# Patient Record
Sex: MALE | Race: WHITE
[De-identification: names, ages, dates, MRNs, and addresses within clinical notes are randomized per-mention and may not be internally consistent; named-entity substitution may affect disease eponyms.]

---

## 2019-10-16 ENCOUNTER — HOSPITAL ENCOUNTER (OUTPATIENT)
Dept: HOSPITAL 47 - LABWHC1 | Age: 79
End: 2019-10-16
Attending: UROLOGY
Payer: MEDICARE

## 2019-10-16 DIAGNOSIS — R97.20: Primary | ICD-10-CM

## 2019-10-16 PROCEDURE — 36415 COLL VENOUS BLD VENIPUNCTURE: CPT

## 2019-10-16 PROCEDURE — 84153 ASSAY OF PSA TOTAL: CPT

## 2023-06-18 ENCOUNTER — HOSPITAL ENCOUNTER (EMERGENCY)
Dept: HOSPITAL 47 - EC | Age: 83
LOS: 1 days | Discharge: HOME | End: 2023-06-19
Payer: MEDICARE

## 2023-06-18 VITALS — RESPIRATION RATE: 16 BRPM | TEMPERATURE: 97.1 F

## 2023-06-18 DIAGNOSIS — S01.81XA: Primary | ICD-10-CM
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
## 2023-06-18 PROCEDURE — 72170 X-RAY EXAM OF PELVIS: CPT

## 2023-06-18 PROCEDURE — 71045 X-RAY EXAM CHEST 1 VIEW: CPT

## 2023-06-18 PROCEDURE — 70450 CT HEAD/BRAIN W/O DYE: CPT

## 2023-06-18 PROCEDURE — 99284 EMERGENCY DEPT VISIT MOD MDM: CPT

## 2023-06-18 PROCEDURE — 72125 CT NECK SPINE W/O DYE: CPT

## 2023-06-18 PROCEDURE — 93005 ELECTROCARDIOGRAM TRACING: CPT

## 2023-06-18 PROCEDURE — 12011 RPR F/E/E/N/L/M 2.5 CM/<: CPT

## 2023-06-18 NOTE — ED
General Adult HPI





- General


Chief complaint: Fall


Stated complaint: FALL WITH HEAD INJURY


Time Seen by Provider: 06/18/23 22:30


Source: patient


Limitations: no limitations





- History of Present Illness


Initial comments: 


Dictation was produced using dragon dictation software. please excuse any 

grammatical, word or spelling errors. 











Chief Complaint: 83-year-old male with past medical history of neuropathy 

presents emergency department after fall and head laceration





History of Present Illness:.  Patient is an 83-year-old male brought in by EMS. 

Patient's history of neuropathy.  He states that he has chronic unsteady gait.  

States that he lost his balance causing fall.  He did hit his head.  Denies any 

loss of consciousness.  Patient denies history of anticoagulation use.  Denies 

any headache.








The ROS documented in this emergency department record has been reviewed and 

confirmed by me.  Those systems with pertinent positive or negative responses 

have been documented in the HPI.  All other systems are other negative and/or 

noncontributory.

















- Related Data


                                    Allergies











Allergy/AdvReac Type Severity Reaction Status Date / Time


 


No Known Allergies Allergy   Verified 06/18/23 22:35














Review of Systems


ROS Statement: 


Those systems with pertinent positive or pertinent negative responses have been 

documented in the HPI.





ROS Other: All systems not noted in ROS Statement are negative.





Past Medical History


Past Medical History: Prostate Disorder


Additional Past Medical History / Comment(s): neuropathy


History of Any Multi-Drug Resistant Organisms: None Reported


Past Surgical History: No Surgical Hx Reported


Past Psychological History: No Psychological Hx Reported


Smoking Status: Never smoker


Past Alcohol Use History: Occasional


Past Drug Use History: None Reported





General Exam





- General Exam Comments


Initial Comments: 








PHYSICAL EXAM:


General Impression: Alert and oriented x3, not in acute distress


HEENT: 2 cm laceration to the right eyebrow, extra-ocular movements intact, 

pupils equal and reactive to light bilaterally, mucous membranes moist.


Cardiovascular: Heart regular rate and rhythm


Chest: Able to complete full sentences, no retractions, no tachypnea


Abdomen: abdomen soft, non-tender, non-distended, no organomegaly


Musculoskeletal: Pulses present and equal in all extremities, no peripheral 

edema


Motor:  no focal deficits noted


Neurological: CN II-XII grossly intact, no focal motor or sensory deficits noted


Skin: Intact with no visualized rashes


Psych: Normal affect and mood











Limitations: no limitations





Course


                                   Vital Signs











  06/18/23 06/18/23 06/18/23





  22:33 22:36 23:00


 


Temperature  97.1 F L 


 


Pulse Rate 78 77 80


 


Respiratory 18 16 16





Rate   


 


Blood Pressure  123/61 123/61


 


O2 Sat by Pulse 96 98 97





Oximetry   














  06/19/23 06/19/23





  00:00 01:00


 


Temperature  


 


Pulse Rate 81 85


 


Respiratory 16 16





Rate  


 


Blood Pressure 121/61 


 


O2 Sat by Pulse 95 94 L





Oximetry  














Procedures





- Laceration


  ** Laceration #1


Consent Obtained: verbal consent


Indication: laceration


Site: face


Description: linear


Depth: simple, single layer


Anesthetic Used: lidocaine 2%, with epi


Anesthesia Technique: local infiltration


Pre-repair: wound explored, deep structures intact


Type of Sutures: nylon


Size of Sutures: 5-0 (3 cm)


Technique: simple, interrupted


Patient Tolerated Procedure: well





Medical Decision Making





- Medical Decision Making


Was pt. sent in by a medical professional or institution (OVID[facility] Thakkar, NP, urgent 

care, hospital, or nursing home...) When possible be specific


@  -No


Did you speak to anyone other than the patient for history (EMS, parent, family,

police, friend...)? What history was obtained from this source 


@  -No


Did you review nursing and triage notes (agree or disagree)?  Why? 


@  -I reviewed and agree with nursing and triage notes


Were old charts reviewed (outside hosp., previous admission, EMS record, old 

EKG, old radiological studies, urgent care reports/EKG's, nursing home records)?

Report findings 


@  -No old charts were reviewed


Differential Diagnosis (chest pain, altered mental status, abdominal pain women,

abdominal pain men, vaginal bleeding, musculoskeletal, weakness, fever, dyspnea,

syncope, headache, dizziness, GI bleed, back pain, seizure, CVA, palpatations, 

mental health)? 


@  -Head injury, skull fracture, cranial bleed, facial fracture


EKG interpreted by me (3pts min.).


@  -[facility] EKG interpretation: Ventricular rate 76, sinus rhythm,.  179, QRS 76, 

. No OK prolongation, no QTC prolongation, no ST or T-wave changes noted.

   Overall, this EKG is unremarkable





X-rays interpreted by me (1pt min.).


@  -Chest x-ray and pelvis x-ray unremarkable


CT interpreted by me (1pt min.).


@  -Computed tomography scan of the head and C-spine shows no acute processes


U/S interpreted by me (1pt. min.).


@  -None done


What testing was considered but not performed or refused? (CT, X-rays, U/S, 

labs)? Why?


@  -None


What meds were considered but not given or refused? Why?


@  -None


Did you discuss the management of the patient with other professionals 

(professionals i.e. Dr., PA, NP, lab, RT, psych nurse, , , 

teacher, , )? Give summary


@  -No


Was smoking cessation discussed for >3mins.?


@  -No


Was critical care preformed (if so, how long)?


@  -No


Were there social determinants of health that impacted care today? How? 

(Homelessness, low income, unemployed, alcoholism, drug addiction, 

transportation, low edu. Level, literacy, decrease access to med. care, group home, 

rehab)?


@  -No


Was there de-escalation of care discussed even if they declined (Discuss DNR or 

withdrawal of care, Hospice)? DNR status


@  -No


What co-morbidities impacted this encounter? (DM, HTN, Smoking, COPD, CAD, 

Cancer, CVA, ARF, Chemo, Hep., AIDS, mental health diagnosis, sleep apnea, 

morbid obesity)?


@  -None


Was patient admitted / discharged? Hospital course, mention meds given and 

route, prescriptions, significant lab abnormalities, going to OR and other 

pertinent info.


@  -See above 


Undiagnosed new problem with uncertain prognosis?


@  -No


Drug Therapy requiring intensive monitoring for toxicity (Heparin, Nitro, 

Insulin, Cardizem)?


@  -No


Were any procedures done?


@  -Laceration repair, see above for further detail


Diagnosis/symptom?  Acute, or Chronic, or Acute on Chronic?  Uncomplicated 

(without systemic symptoms) or Complicated (systemic symptoms)?


@  -83-year-old male presents to the emergency department after mechanical fall.

 Laceration.  Bedside.  Vital signs stable.  Patient denies any cardiac history.

 CT imaging and x-rays are unremarkable.  Patient discharged


Side effects of treatment?


@  -No


Exacerbation, Progression, or Severe Exacerbation?


@  -No


Poses a threat to life or bodily function? How? (Chest pain, USA, MI, pneumonia,

PE, COPD, DKA, ARF, appy, cholecystitis, CVA, Diverticulitis, Homicidal, 

Suicidal, threat to staff... and all critical care pts)


@  -No








Disposition


Clinical Impression: 


 Fall, Facial laceration





Disposition: HOME SELF-CARE


Condition: Good


Instructions (If sedation given, give patient instructions):  Fall Prevention 

for Older Adults (ED)


Additional Instructions: 


stitches removal in 5 days


Is patient prescribed a controlled substance at d/c from ED?: No


Referrals: 


Maksim Wilder MD [Primary Care Provider] - 1-2 days

## 2023-06-19 VITALS — SYSTOLIC BLOOD PRESSURE: 114 MMHG | DIASTOLIC BLOOD PRESSURE: 72 MMHG | HEART RATE: 80 BPM

## 2023-06-19 NOTE — XR
EXAM:

  XR Pelvis, 1 or 2 Views

 

CLINICAL HISTORY:

  ITS.REASON XR Reason: fall

 

TECHNIQUE:

  Frontal view of the pelvis.

 

COMPARISON:

  No relevant prior studies available.

 

FINDINGS:

  Bones/joints:  No acute osseous abnormalities.

  Soft tissues:  Unremarkable.

 

IMPRESSION:     

No acute bony pelvic injury.

## 2023-06-19 NOTE — XR
EXAM:

  XR Chest, 1 View

 

CLINICAL HISTORY:

  ITS.REASON XR Reason: fall

 

TECHNIQUE:

  Frontal view of the chest.

 

COMPARISON:

  No relevant prior studies available.

 

FINDINGS:

  Lungs:  Unremarkable.  No consolidation.

  Pleural space:  Unremarkable.  No pneumothorax. No pleural effusions.

  Heart:  Unremarkable.  No cardiomegaly.

  Mediastinum:  Unremarkable.

  Bones/joints:  No acute osseous abnormalities.

 

IMPRESSION:     

  No acute cardiopulmonary disease.

## 2023-06-19 NOTE — CT
EXAM:

  CT Head Without Intravenous Contrast

 

CLINICAL HISTORY:

  ITS.REASON CT Reason: fall

 

TECHNIQUE:

  Axial computed tomography images of the head/brain without intravenous 

contrast.  CTDI is 45.2 mGy and DLP is 765 mGy-cm.  This CT exam was 

performed using one or more of the following dose reduction techniques: 

automated exposure control, adjustment of the mA and/or kV according to 

patient size, and/or use of iterative reconstruction technique.

 

COMPARISON:

  No relevant prior studies available.

 

FINDINGS:

  Brain:  Mild periventricular white matter changes, likely related to 

microangiopathy.  No hemorrhage.

  Ventricles:  Unremarkable.  No ventriculomegaly.

  Bones/joints:  Unremarkable.  No acute fracture.

  Soft tissues:  Unremarkable.

  Sinuses:  Thickening of bilateral maxillary sinuses and left frontal 

sinus.

  Mastoid air cells:  Unremarkable as visualized.  No mastoid effusion.

 

IMPRESSION:     

  Mild periventricular white matter changes, likely related to 

microangiopathy.

 

_______________________________________________

 

EXAM:

  CT Cervical Spine Without Intravenous Contrast

 

CLINICAL HISTORY:

  ITS.REASON CT Reason: fall

 

TECHNIQUE:

  Axial computed tomography images of the cervical spine without 

intravenous contrast.  CTDI is 13.9 mGy and DLP is 765 mGy-cm.  This CT 

exam was performed using one or more of the following dose reduction 

techniques: automated exposure control, adjustment of the mA and/or kV 

according to patient size, and/or use of iterative reconstruction 

technique.

 

COMPARISON:

  No relevant prior studies available.

 

FINDINGS:

  Vertebrae:  Unremarkable.  No acute fracture.  Normal alignment of the 

cervical spine with preservation of vertebral body heights.

  Discs/spinal canal/neural foramina:  Multilevel degenerative disc 

disease without significant bony spinal canal stenosis at any cervical 

level.

  Soft tissues:  Unremarkable.

 

IMPRESSION:     

  No acute fracture or dislocation of the cervical spine.

## 2024-12-06 ENCOUNTER — HOSPITAL ENCOUNTER (EMERGENCY)
Dept: HOSPITAL 47 - EC | Age: 84
Discharge: HOME | End: 2024-12-06
Payer: MEDICARE

## 2024-12-06 VITALS — TEMPERATURE: 98.6 F

## 2024-12-06 VITALS — DIASTOLIC BLOOD PRESSURE: 68 MMHG | HEART RATE: 84 BPM | SYSTOLIC BLOOD PRESSURE: 143 MMHG | RESPIRATION RATE: 16 BRPM

## 2024-12-06 DIAGNOSIS — R33.9: Primary | ICD-10-CM

## 2024-12-06 LAB
PH UR: 5.5 [PH] (ref 5–8)
SP GR UR: 1 (ref 1–1.03)
UROBILINOGEN UR QL STRIP: <2 MG/DL (ref ?–2)

## 2024-12-06 PROCEDURE — 51702 INSERT TEMP BLADDER CATH: CPT

## 2024-12-06 PROCEDURE — 81003 URINALYSIS AUTO W/O SCOPE: CPT

## 2024-12-06 PROCEDURE — 99283 EMERGENCY DEPT VISIT LOW MDM: CPT

## 2024-12-06 NOTE — ED
Male Urogenital HPI





- General


Chief complaint: Urogenital


Stated complaint: Unable To Urinate


Time Seen by Provider: 12/06/24 06:47


Source: patient, family, RN notes reviewed


Mode of arrival: wheelchair


Limitations: no limitations





- History of Present Illness


Initial comments: 


84-year-old male presents emergency department chief complaint of unable to ur

inate.  Patient states he had some dribbling throughout the night but states he 

feels very distended and feels like he has to urinate.  He states he had this 

happen approximate 1 year ago did see his urologist Dr. Carrillo who placed him on 

Flomax after catheter placement he is currently on Flomax he has had no prior 

dysuria to this.  He denies any fevers chills no flank pain no other complaints








- Related Data


                                    Allergies











Allergy/AdvReac Type Severity Reaction Status Date / Time


 


No Known Allergies Allergy   Verified 12/06/24 06:53














Review of Systems


ROS Statement: 


Those systems with pertinent positive or pertinent negative responses have been 

documented in the HPI.





ROS Other: All systems not noted in ROS Statement are negative.





Past Medical History


Past Medical History: Prostate Disorder


Additional Past Medical History / Comment(s): neuropathy


History of Any Multi-Drug Resistant Organisms: None Reported


Past Surgical History: No Surgical Hx Reported


Past Psychological History: No Psychological Hx Reported


Smoking Status: Never smoker


Past Alcohol Use History: Occasional


Past Drug Use History: None Reported





General Exam


Limitations: no limitations


General appearance: alert, in no apparent distress


Head exam: Present: atraumatic, normocephalic, normal inspection


Respiratory exam: Present: normal lung sounds bilaterally.  Absent: respiratory 

distress, wheezes, rales, rhonchi, stridor


Cardiovascular Exam: Present: regular rate, normal rhythm, normal heart sounds. 

Absent: systolic murmur, diastolic murmur, rubs, gallop, clicks


GI/Abdominal exam: Present: soft, tenderness, normal bowel sounds.  Absent: 

distended, guarding, rebound, rigid





Course


                                   Vital Signs











  12/06/24 12/06/24





  06:52 08:36


 


Temperature 98.6 F 98.6 F


 


Pulse Rate 99 84


 


Respiratory 18 16





Rate  


 


Blood Pressure 159/81 143/68


 


O2 Sat by Pulse 99 99





Oximetry  














Medical Decision Making





- Medical Decision Making


Was pt. sent in by a medical professional or institution (, PA, NP, urgent 

care, hospital, or nursing home...) When possible be specific


@ -No


Did you speak to anyone other than the patient for history (EMS, parent, family,

police, friend...)? What history was obtained from this source 


@ -No


Did you review nursing and triage notes (agree or disagree)? Why? 


@ -I reviewed and agree with nursing and triage notes


Were old charts reviewed (outside hosp., previous admission, EMS record, old 

EKG, old radiological studies, urgent care reports/EKG's, nursing home records)?

Report findings 


@ -No old charts were reviewed


Differential Diagnosis (chest pain, altered mental status, abdominal pain women,

abdominal pain men, vaginal bleeding, weakness, fever, dyspnea, syncope, 

headache, dizziness, GI bleed, back pain, seizure, CVA, palpatations, mental 

health, musculoskeletal)? 


@ -UTI, BPH, urinary retention


EKG interpreted by me (3pts min.).


@ -None


X-rays interpreted by me (1pt min.).


@ -None done


CT interpreted by me (1pt min.).


@ -None done


U/S interpreted by me (1pt. min.).


@ -None done


What testing was considered but not performed or refused? (CT, X-rays, U/S, 

labs)? Why?


@ -None


What meds were considered but not given or refused? Why?


@ -None


Did you discuss the management of the patient with other professionals 

(professionals i.e. , PA, NP, lab, RT, psych nurse, , , 

teacher, , )? Give summary


@ -No


Was smoking cessation discussed for >3mins.?


@ -No


Was critical care preformed (if so, how long)?


@ -No


Were there social determinants of health that impacted care today? How? 

(Homelessness, low income, unemployed, alcoholism, drug addiction, 

transportation, low edu. Level, literacy, decrease access to med. care, prison, 

rehab)?


@ -No


Was there de-escalation of care discussed even if they declined (Discuss DNR or 

withdrawal of care, Hospice)? DNR status


@ -No


What co-morbidities impacted this encounter? (DM, HTN, Smoking, COPD, CAD, 

Cancer, CVA, ARF, Chemo, Hep., AIDS, mental health diagnosis, sleep apnea, 

morbid obesity)?


@ -None


Was patient admitted / discharged? Hospital course, mention meds given and 

route, prescriptions, significant lab abnormalities, going to OR and other 

pertinent info.


@ -Charge patient had Carcamo catheter placed patient feels greatly improved 

patient will be discharged with follow-up to urology patient has no evidence of 

UTI patient is currently on Flomax.


Undiagnosed new problem with uncertain prognosis?


@ -No


Drug Therapy requiring intensive monitoring for toxicity (Heparin, Nitro, 

Insulin, Cardizem)?


@ -No


Were any procedures done?


@ -No


Diagnosis/symptom?


@ -Urinary retention


Acute, or Chronic, or Acute on Chronic?


@ -Acute


Uncomplicated (without systemic symptoms) or Complicated (systemic symptoms)?


@ -Uncomplicated


Side effects of treatment?


@ -No


Exacerbation, Progression, or Severe Exacerbation?


@ -No


Poses a threat to life or bodily function? How? (Chest pain, USA, MI, pneumonia,

PE, COPD, DKA, ARF, appy, cholecystitis, CVA, Diverticulitis, Homicidal, 

Suicidal, threat to staff... and all critical care pts)


@ -No








- Lab Data


                                   Lab Results











  12/06/24 Range/Units





  07:23 


 


Urine Color  Colorless  


 


Urine Appearance  Clear  (Clear)  


 


Urine pH  5.5  (5.0-8.0)  


 


Ur Specific Gravity  1.005  (1.001-1.035)  


 


Urine Protein  Negative  (Negative)  


 


Urine Glucose (UA)  Negative  (Negative)  


 


Urine Ketones  Negative  (Negative)  


 


Urine Blood  Negative  (Negative)  


 


Urine Nitrite  Negative  (Negative)  


 


Urine Bilirubin  Negative  (Negative)  


 


Urine Urobilinogen  <2.0  (<2.0)  mg/dL


 


Ur Leukocyte Esterase  Negative  (Negative)  














Disposition


Clinical Impression: 


 Urinary retention





Disposition: HOME SELF-CARE


Condition: Stable


Instructions (If sedation given, give patient instructions):  Urinary Retention 

in Men (ED)


Additional Instructions: 


Please return to the Emergency Department if symptoms worsen or any other 

concerns.


Is patient prescribed a controlled substance at d/c from ED?: No


Referrals: 


Maksim Wilder MD [Primary Care Provider] - 1-2 days


Ever Carrillo MD [STAFF PHYSICIAN] - 1-2 days


Time of Disposition: 08:17

## 2024-12-23 ENCOUNTER — HOSPITAL ENCOUNTER (EMERGENCY)
Dept: HOSPITAL 47 - EC | Age: 84
Discharge: HOME | End: 2024-12-23
Payer: MEDICARE

## 2024-12-23 VITALS — TEMPERATURE: 97.4 F | RESPIRATION RATE: 18 BRPM

## 2024-12-23 VITALS — DIASTOLIC BLOOD PRESSURE: 73 MMHG | SYSTOLIC BLOOD PRESSURE: 145 MMHG | HEART RATE: 78 BPM

## 2024-12-23 DIAGNOSIS — R33.9: Primary | ICD-10-CM

## 2024-12-23 LAB
PH UR: 5.5 [PH] (ref 5–8)
RBC UR QL: 10 /HPF (ref 0–5)
SP GR UR: 1 (ref 1–1.03)
UROBILINOGEN UR QL STRIP: <2 MG/DL (ref ?–2)
WBC #/AREA URNS HPF: 7 /HPF (ref 0–5)

## 2024-12-23 PROCEDURE — 51702 INSERT TEMP BLADDER CATH: CPT

## 2024-12-23 PROCEDURE — 99284 EMERGENCY DEPT VISIT MOD MDM: CPT

## 2024-12-23 PROCEDURE — 81001 URINALYSIS AUTO W/SCOPE: CPT

## 2024-12-23 NOTE — ED
Male Urogenital HPI





- General


Source: patient, RN notes reviewed


Mode of arrival: wheelchair


Limitations: no limitations





- History of Present Illness


Onset/Timin


-: days(s)





<Robbi Hussein - Last Filed: 24 18:56>





- General


Source: patient, family (daughter), RN notes reviewed, old records reviewed


Mode of arrival: wheelchair


Limitations: no limitations





<Марина Briseno - Last Filed: 24 20:38>





- General


Chief complaint: Urogenital


Stated complaint: Urogenital


Time Seen by Provider: 24 17:59





- History of Present Illness


Initial comments: 





Quick note: This is an 84-year-old male presenting with urinary retention since 

earlier today.  Patient states he had a Carcamo catheter placed 3 weeks ago.  

States he had seen Dr. Hamilton earlier who advised to keep catheter in place.  

States catheter was later removed this morning.  States he has been unable to 

urinate since that time with associated lower abdominal discomfort.  Patient 

denies other symptoms. (Robbi Hussein)


84-year-old male presented to ER for evaluation of urinary retention.  Daughter,

at bedside, aiding in HPI and past medical history.Patient seen here on 

2024 and diagnosed with urinary retention Carcamo was placed at that time.  

Patient followed up with Dr. Carrillo today and had Carcamo catheter removed.  

Daughter, at bedside, states that patient has an enlarged prostate.  Since Carcamo

catheter removal patient has not been able to urinate.  He states he will 

dribble with no complete emptying of bladder.  He is reporting super pubic 

abdominal discomfort and pressure.  He denies any fevers, chills, nausea, 

vomiting, constipation/diarrhea, peripheral edema, chest pain, shortness of 

breath or other complaints. (Марина Briseno)





- Related Data


                                    Allergies











Allergy/AdvReac Type Severity Reaction Status Date / Time


 


No Known Allergies Allergy   Verified 24 06:53














Review of Systems


ROS Other: All systems not noted in ROS Statement are negative.





<Robbi Hussein - Last Filed: 24 18:56>


ROS Other: All systems not noted in ROS Statement are negative.





<Марина Briseno - Last Filed: 24 20:38>


ROS Statement: 


Those systems with pertinent positive or pertinent negative responses have been 

documented in the HPI.








Past Medical History


Past Medical History: Prostate Disorder


Additional Past Medical History / Comment(s): neuropathy


History of Any Multi-Drug Resistant Organisms: None Reported


Past Surgical History: No Surgical Hx Reported


Past Psychological History: No Psychological Hx Reported


Smoking Status: Never smoker


Past Alcohol Use History: Occasional


Past Drug Use History: None Reported





<Robbi Hussein - Last Filed: 24 18:56>





General Exam


Limitations: no limitations





<Robbi Hussein - Last Filed: 24 18:56>


Limitations: language barrier (Hard of hearing)


General appearance: alert, in no apparent distress


Respiratory exam: Present: normal lung sounds bilaterally.  Absent: respiratory 

distress, wheezes, rales, rhonchi, stridor


Cardiovascular Exam: Present: regular rate, normal rhythm, normal heart sounds. 

Absent: systolic murmur, diastolic murmur, rubs, gallop, clicks


GI/Abdominal exam: Present: soft, tenderness (Suprapubic), normal bowel sounds


Extremities exam: Present: normal inspection, full ROM, normal capillary refill.

 Absent: tenderness, pedal edema, joint swelling, calf tenderness


Neurological exam: Present: alert, oriented X3, CN II-XII intact


Skin exam: Present: warm, dry, intact, normal color.  Absent: rash





<Марина Briseno - Last Filed: 24 20:38>





- General Exam Comments


Initial Comments: 





Visual Physical Exam





Vital signs reviewed





General: Well-appearing, nontoxic, no acute distress.  Patient seated in 

wheelchair.


Head: Normocephalic, atraumatic


Eyes: PERRLA, EOMI


ENT: Airway patent


Chest: Nonlabored breathing


Skin: No visual rash, normal skin tone


Neuro: Alert and oriented 3


Musculoskeletal: No gross abnormalities


 (Robbi Hussein)





Course





<Марина Briseno - Last Filed: 24 20:38>


                                   Vital Signs











  24





  17:51


 


Temperature 97.4 F L


 


Pulse Rate 76


 


Respiratory 18





Rate 


 


Blood Pressure 106/56


 


O2 Sat by Pulse 97





Oximetry 














- Reevaluation(s)


Reevaluation #1: 





24 20:10


Prevoid bladder scan 916. (Марина Briseno)





Medical Decision Making





<Robbi Hussein - Last Filed: 24 18:56>





<Марина Briseno - Last Filed: 24 20:38>





- Medical Decision Making





I completed the quick note portion of this chart signed ANUP Hernandez (Robbi Hussein)


Was pt. sent in by a medical professional or institution (OVIDIO Thakkar, NP, urgent 

care, hospital, or nursing home...) When possible be specific


@  -No


Did you speak to anyone other than the patient for history (EMS, parent, family,

police, friend...)? What history was obtained from this source 


@  -Daughter, aiding in HPI and past medical history.


Did you review nursing and triage notes (agree or disagree)?  Why? 


@  -I reviewed and agree with nursing and triage notes


Were old charts reviewed (outside hosp., previous admission, EMS record, old 

EKG, old radiological studies, urgent care reports/EKG's, nursing home records)?

Report findings 


@  -Yes, I reviewed ER visit from .  Patient seen here for urinary 

retention and Carcamo catheter placed and patient instructed to follow-up with 

urology.


Differential Diagnosis (chest pain, altered mental status, abdominal pain women,

abdominal pain men, vaginal bleeding, weakness, fever, dyspnea, syncope, 

headache, dizziness, GI bleed, back pain, seizure, CVA, palpatations, mental 

health, musculoskeletal)? 


@  -Differential Abdominal Pain Men:Appendicitis, cholecystitis, diverticulosis,

ischemic bowel, pancreatitis, hepatitis, UTI, gastroenteritis, AAA, incarcerated

hernia, bowel obstruction, constipation, inflammatory bowel, hepatitis, peptic 

ulcer disease, splenic infarction, perforated viscus, testicular torsion, this 

is not meant to be an all-inclusive list


EKG interpreted by me (3pts min.).


@  -None done]


X-rays interpreted by me (1pt min.).


@  -None done


CT interpreted by me (1pt min.).


@  -None done


U/S interpreted by me (1pt. min.).


@  -None done


What testing was considered but not performed or refused? (CT, X-rays, U/S, 

labs)? Why?


@  -None


What meds were considered but not given or refused? Why?


@  -None


Did you discuss the management of the patient with other professionals 

(professionals i.e. Dr., PA, NP, lab, RT, psych nurse, , , 

teacher, , )? Give summary


@  -No


Was smoking cessation discussed for >3mins.?


@  -No


Was critical care preformed (if so, how long)?


@  -No


Were there social determinants of health that impacted care today? How? 

(Homelessness, low income, unemployed, alcoholism, drug addiction, 

transportation, low edu. Level, literacy, decrease access to med. care, penitentiary, 

rehab)?


@  -No


Was there de-escalation of care discussed even if they declined (Discuss DNR or 

withdrawal of care, Hospice)? DNR status


@  -No


What co-morbidities impacted this encounter? (DM, HTN, Smoking, COPD, CAD, 

Cancer, CVA, ARF, Chemo, Hep., AIDS, mental health diagnosis, sleep apnea, 

morbid obesity)?


@  -Urinary retention


Was patient admitted / discharged? Hospital course, mention meds given and 

route, prescriptions, significant lab abnormalities, going to OR and other 

pertinent info.


@  -Discharge.  84-year-old male presenting to the ER for evaluation of urinary 

retention.  Patient had Carcamo catheter removed by Dr. Carrillo earlier today. Upon 

rooming, hstory and physical exam completed.  Vitals stable.  There is tendern

ess to suprapubic region.  Exam otherwise benign.  Prevoid bladder scan at 916 

mL.  Carcamo catheter placed at that time with approximately 900 mL of output.  

Urinalysis unremarkable without evidence of infection.  Upon reevaluation, 

patient reported improvement of discomfort and is stable for discharge.  Patient

will be discharged with Carcamo catheter in place and instructed to follow-up with

urology outpatient.  Strict return parameters discussed.  Patient discharged in 

stable condition with follow-up to PCP.  Patient verbally expressed 

understanding and agreement with care plan.  Case discussed with ED attending, 

Dr. Mendenhall.


Undiagnosed new problem with uncertain prognosis?


@  -No


Drug Therapy requiring intensive monitoring for toxicity (Heparin, Nitro, 

Insulin, Cardizem)?


@  -No


Were any procedures done?


@  -No


Diagnosis/symptom?


@  -Urinary retention


Acute, or Chronic, or Acute on Chronic?


@  -Acute


Uncomplicated (without systemic symptoms) or Complicated (systemic symptoms)?


@  -Uncomplicated


Side effects of treatment?


@  -No


Exacerbation, Progression, or Severe Exacerbation?


@  -No


Poses a threat to life or bodily function? How? (Chest pain, USA, MI, pneumonia,

PE, COPD, DKA, ARF, appy, cholecystitis, CVA, Diverticulitis, Homicidal, 

Suicidal, threat to staff... and all critical care pts)


@  -No


 (Марина Briseno)





Disposition





<Robbi Hussein - Last Filed: 24 18:56>


Is patient prescribed a controlled substance at d/c from ED?: No


Time of Disposition: 20:33





<Марина Briseno - Last Filed: 24 20:38>


Clinical Impression: 


 Urinary retention





Disposition: HOME SELF-CARE


Condition: Stable


Instructions (If sedation given, give patient instructions):  Urinary Retention 

in Men (ED)


Additional Instructions: 


Follow-up with Dr. Carrillo, urology. Return to the ER for any new or worsening 

symptoms.


Referrals: 


Maksim Wilder MD [Primary Care Provider] - 1-2 days


Ever Carrillo MD [STAFF PHYSICIAN] - 1-2 days

## 2024-12-25 ENCOUNTER — HOSPITAL ENCOUNTER (EMERGENCY)
Dept: HOSPITAL 47 - EC | Age: 84
Discharge: HOME | End: 2024-12-25
Payer: MEDICARE

## 2024-12-25 VITALS — TEMPERATURE: 98 F

## 2024-12-25 VITALS — HEART RATE: 88 BPM | DIASTOLIC BLOOD PRESSURE: 78 MMHG | SYSTOLIC BLOOD PRESSURE: 131 MMHG | RESPIRATION RATE: 16 BRPM

## 2024-12-25 DIAGNOSIS — N39.0: Primary | ICD-10-CM

## 2024-12-25 LAB
HYALINE CASTS UR QL AUTO: 1 /LPF (ref 0–2)
PH UR: 5.5 [PH] (ref 5–8)
RBC UR QL: 13 /HPF (ref 0–5)
SP GR UR: 1 (ref 1–1.03)
SQUAMOUS UR QL AUTO: <1 /HPF (ref 0–4)
UROBILINOGEN UR QL STRIP: <2 MG/DL (ref ?–2)
WBC #/AREA URNS HPF: 64 /HPF (ref 0–5)

## 2024-12-25 PROCEDURE — 87186 SC STD MICRODIL/AGAR DIL: CPT

## 2024-12-25 PROCEDURE — 87077 CULTURE AEROBIC IDENTIFY: CPT

## 2024-12-25 PROCEDURE — 81001 URINALYSIS AUTO W/SCOPE: CPT

## 2024-12-25 PROCEDURE — 87086 URINE CULTURE/COLONY COUNT: CPT

## 2024-12-25 PROCEDURE — 51702 INSERT TEMP BLADDER CATH: CPT

## 2024-12-25 PROCEDURE — 99283 EMERGENCY DEPT VISIT LOW MDM: CPT

## 2024-12-25 RX ADMIN — CEPHALEXIN STA MG: 500 CAPSULE ORAL at 05:05

## 2024-12-25 RX ADMIN — CEPHALEXIN STA EACH: 500 CAPSULE ORAL at 05:07

## 2024-12-25 NOTE — ED
Male Urogenital HPI





- General


Chief complaint: Urogenital


Stated complaint: Unable to urinate


Time Seen by Provider: 12/25/24 04:06


Source: patient, RN notes reviewed, old records reviewed, Caregiver


Mode of arrival: wheelchair


Limitations: no limitations





- History of Present Illness


Initial comments: 





This is an 84-year-old male to the ER for evaluation of recurrent urinary 

retention with pain.  Indwelling Carcamo catheter and some increasing pain and 

decreased output


MD Complaint: dysuria


-: days(s)


Location: penis


Radiation: none


Severity: mild


Quality: burning


Consistency: constant


Improves with: none


Worsens with: none


indwelling catheter


Reports: denies other symptoms





- Related Data


                                  Previous Rx's











 Medication  Instructions  Recorded


 


Cephalexin [Keflex] 500 mg PO TID #20 cap 12/25/24











                                    Allergies











Allergy/AdvReac Type Severity Reaction Status Date / Time


 


No Known Allergies Allergy   Verified 12/06/24 06:53














Review of Systems


ROS Statement: 


Those systems with pertinent positive or pertinent negative responses have been 

documented in the HPI.





ROS Other: All systems not noted in ROS Statement are negative.





Past Medical History


Past Medical History: Prostate Disorder


Additional Past Medical History / Comment(s): neuropathy


History of Any Multi-Drug Resistant Organisms: None Reported


Past Surgical History: No Surgical Hx Reported


Past Psychological History: No Psychological Hx Reported


Smoking Status: Never smoker


Past Alcohol Use History: Occasional


Past Drug Use History: None Reported





General Exam


Limitations: no limitations


General appearance: alert, in no apparent distress


Head exam: Present: atraumatic, normocephalic, normal inspection


Eye exam: Present: normal appearance, PERRL, EOMI.  Absent: scleral icterus, 

conjunctival injection, periorbital swelling


ENT exam: Present: normal exam, mucous membranes moist


Neck exam: Present: normal inspection.  Absent: tenderness, meningismus, 

lymphadenopathy


Respiratory exam: Present: normal lung sounds bilaterally.  Absent: respiratory 

distress, wheezes, rales, rhonchi, stridor


Cardiovascular Exam: Present: regular rate, normal rhythm, normal heart sounds. 

 Absent: systolic murmur, diastolic murmur, rubs, gallop, clicks


GI/Abdominal exam: Present: soft, normal bowel sounds.  Absent: distended, 

tenderness, guarding, rebound, rigid


Extremities exam: Present: normal inspection, full ROM, normal capillary refill.

  Absent: tenderness, pedal edema, joint swelling, calf tenderness


Back exam: Present: normal inspection


Neurological exam: Present: alert, oriented X3, CN II-XII intact


Psychiatric exam: Present: normal affect, normal mood


Skin exam: Present: warm, dry, intact, normal color.  Absent: rash





Course


                                   Vital Signs











  12/25/24 12/25/24





  04:02 05:25


 


Temperature 98 F 


 


Pulse Rate 100 88


 


Respiratory 20 16





Rate  


 


Blood Pressure 141/79 131/78


 


O2 Sat by Pulse 96 99





Oximetry  














- Reevaluation(s)


Reevaluation #1: 





Medical records reviewed


Reevaluation #2: 





Patient symptoms improved


Reevaluation #3: 





Patient informed of results questions answered


Reevaluation #4: 





Was pt. sent in by a medical professional or institution (, PA, NP, urgent 

care, hospital, or nursing home...) When possible be specific


@  -no


Did you speak to anyone other than the patient for history (EMS, parent, family,

 police, friend...)? What history was obtained from this source 


@  -no


Did you review nursing and triage notes (agree or disagree)?  Why? 


@  -agree


Are old charts reviewed (outside hosp., previous admission, EMS record, old EKG,

 old radiological studies, urgent care reports/EKG's, nursing home records)? 

Report findings 


@  -yes


Differential Diagnosis (chest pain, altered mental status, abdominal pain women,

 abdominal pain men, vaginal bleeding, weakness, fever, dyspnea, syncope, 

headache, dizziness, GI bleed, back pain, seizure, CVA, palpatations, mental 

health, musculoskeletal)? 


@  -prior


EKG interpreted by me (3pts min.).


@  - no


X-rays interpreted by me (1pt min.).


@  -no


CT interpreted by me (1pt min.).


@  -no


U/S interpreted by me (1pt. min.).


@  -no


What testing was considered but not performed or refused? (CT, X-rays, U/S, 

labs)? Why?


@  -none


What meds were considered but not given or refused? Why?


@  -none


Did you discuss the management of the patient with other professionals 

(professionals i.e. OVIDIO Thakkar, NP, lab, RT, psych nurse, , , 

teacher, , )? Give summary


@  -no


Was smoking cessation discussed for >3mins.?


@  -no


Was critical care preformed (if so, how long)?


@  -no


Were there social determinants of health that impacted care today? How? 

(Homelessness, low income, unemployed, alcoholism, drug addiction, 

transportation, low edu. Level, literacy, decrease access to med. care, custodial, 

rehab)?


@  -none


Was there de-escalation of care discussed even if they declined (Discuss DNR or 

withdrawal of care, Hospice)? DNR status


@  -no


What co-morbidities impacted this encounter? (DM, HTN, Smoking, COPD, CAD, 

Cancer, CVA, ARF, Chemo, Hep., AIDS, mental health diagnosis, sleep apnea, 

morbid obesity)?


@  -none


Was patient admitted / discharged? Hospital course, mention meds given and 

route, prescriptions, significant lab abnormalities, going to OR and other 

pertinent info.


@  -84 male with indwelling Carcamo catheter found to have urinary tract 

infection, catheter is working appropriate and patient can be discharged home


Discharge


Undiagnosed new problem with uncertain prognosis?


@  -no


Drug Therapy requiring intensive monitoring for toxicity (Heparin, Nitro, 

Insulin, Cardizem)?


@  -no


Were any procedures done?


@  -no


Diagnosis/symptom?


@  -Urinary retention indwelling Carcamo catheter UTI


Acute, or Chronic, or Acute on Chronic?


@  -Acute


Uncomplicated (without systemic symptoms) or Complicated (systemic symptoms)?


@  -Complicated


Side effects of treatment?


@  -no


Exacerbation, Progression, or Severe Exacerbation?


@  -exacerbation


Poses a threat to life or bodily function? How? (Chest pain, USA, MI, pneumonia,

 PE, COPD, DKA, ARF, appy, cholecystitis, CVA, Diverticulitis, Homicidal, 

Suicidal, threat to staff... and all critical care pts)


@  -no





Medical Decision Making





- Medical Decision Making





84 male with indwelling Carcamo catheter found to have urinary tract infection, 

catheter is working appropriate and patient can be discharged home





- Lab Data


                                   Lab Results











  12/25/24 Range/Units





  04:30 


 


Urine Color  Colorless  


 


Urine Appearance  Clear  (Clear)  


 


Urine pH  5.5  (5.0-8.0)  


 


Ur Specific Gravity  1.005  (1.001-1.035)  


 


Urine Protein  Negative  (Negative)  


 


Urine Glucose (UA)  Negative  (Negative)  


 


Urine Ketones  Negative  (Negative)  


 


Urine Blood  Large H  (Negative)  


 


Urine Nitrite  Negative  (Negative)  


 


Urine Bilirubin  Negative  (Negative)  


 


Urine Urobilinogen  <2.0  (<2.0)  mg/dL


 


Ur Leukocyte Esterase  Large H  (Negative)  


 


Urine RBC  13 H  (0-5)  /hpf


 


Urine WBC  64 H  (0-5)  /hpf


 


Ur Squamous Epith Cells  <1  (0-4)  /hpf


 


Urine Bacteria  Many H  (None)  /hpf


 


Hyaline Casts  1  (0-2)  /lpf


 


Urine Mucus  Rare H  (None)  /hpf














Disposition


Clinical Impression: 


 Urinary retention, Urinary tract infection





Disposition: HOME SELF-CARE


Condition: Good


Instructions (If sedation given, give patient instructions):  Urinary Tract 

Infection in Men (ED)


Prescriptions: 


Cephalexin [Keflex] 500 mg PO TID #20 cap


Is patient prescribed a controlled substance at d/c from ED?: No


Referrals: 


Maksim Wilder MD [Primary Care Provider] - 1-2 days


Time of Disposition: 05:00